# Patient Record
Sex: MALE | URBAN - METROPOLITAN AREA
[De-identification: names, ages, dates, MRNs, and addresses within clinical notes are randomized per-mention and may not be internally consistent; named-entity substitution may affect disease eponyms.]

---

## 2023-06-30 ENCOUNTER — OFFICE VISIT (OUTPATIENT)
Facility: LOCATION | Age: 68
End: 2023-06-30
Payer: COMMERCIAL

## 2023-06-30 DIAGNOSIS — H25.13 AGE-RELATED NUCLEAR CATARACT, BILATERAL: ICD-10-CM

## 2023-06-30 DIAGNOSIS — H40.1131 PRIMARY OPEN-ANGLE GLAUCOMA BILATERAL MILD STAGE: Primary | ICD-10-CM

## 2023-06-30 DIAGNOSIS — H04.123 DRY EYE SYNDROME OF BILATERAL LACRIMAL GLANDS: ICD-10-CM

## 2023-06-30 PROCEDURE — 92133 CPTRZD OPH DX IMG PST SGM ON: CPT | Performed by: OPHTHALMOLOGY

## 2023-06-30 PROCEDURE — 99214 OFFICE O/P EST MOD 30 MIN: CPT | Performed by: OPHTHALMOLOGY

## 2023-06-30 RX ORDER — LATANOPROST 50 UG/ML
0.005 % SOLUTION OPHTHALMIC
Qty: 7.5 | Refills: 3 | Status: INACTIVE
Start: 2023-06-30 | End: 2024-06-28

## 2023-06-30 RX ORDER — BIMATOPROST 0.1 MG/ML
0.01 % SOLUTION/ DROPS OPHTHALMIC
Qty: 7.5 | Refills: 3 | Status: INACTIVE
Start: 2023-06-30 | End: 2023-06-30

## 2023-06-30 ASSESSMENT — INTRAOCULAR PRESSURE
OD: 13
OS: 12

## 2023-06-30 NOTE — IMPRESSION/PLAN
Impression: 6 month f/u with OCT ONH and DFE OU. POHx: POAG mild OU, Cataract OU, CARLOS, ABMD OS. FOHx: (+) Mother wet AMD. PMHx: None reported. Eye medications: Lumigan QHS OU ($40 per bottle). TMAX: Mid teens OU. Target IOP: < 14 OU. Plan: Testing:
OCT/ONH 6/2023: OD bdl thin S, OS thin S. OU no definite progression. HVF 24-2 12/2022: OD scattered changes, OS r/o new INS. OD stable, OS possible progression (fluctuates). Pachys: 487/486. Gonio 12/2022: SS, 1+ pigment 360 OU. Today:
IOP acceptable OU. OCT ONH performed and reviewed. Informed patient of stable examination and glaucoma testing. Plan:
Continue Lumigan QHS OU. Consider change to Latanoprost if Lumigan is too expensive. RTC in 6 months with IOP check and gonio.

## 2023-06-30 NOTE — IMPRESSION/PLAN
Impression: Examination revealed nuclear cataract. Plan: Will revisit once patient visually bothered by cataracts.

## 2023-06-30 NOTE — IMPRESSION/PLAN
Impression: Examination revealed dry eye syndrome secondary to tear deficiencies. Plan: Recommend patient to use ATs regularly.

## 2024-02-02 ENCOUNTER — OFFICE VISIT (OUTPATIENT)
Facility: LOCATION | Age: 69
End: 2024-02-02
Payer: COMMERCIAL

## 2024-02-02 DIAGNOSIS — H04.123 DRY EYE SYNDROME OF BILATERAL LACRIMAL GLANDS: ICD-10-CM

## 2024-02-02 DIAGNOSIS — H25.13 AGE-RELATED NUCLEAR CATARACT, BILATERAL: ICD-10-CM

## 2024-02-02 DIAGNOSIS — H40.1131 PRIMARY OPEN-ANGLE GLAUCOMA BILATERAL MILD STAGE: Primary | ICD-10-CM

## 2024-02-02 PROCEDURE — 99213 OFFICE O/P EST LOW 20 MIN: CPT | Performed by: OPHTHALMOLOGY

## 2024-02-02 PROCEDURE — 92020 GONIOSCOPY: CPT | Performed by: OPHTHALMOLOGY

## 2024-02-02 ASSESSMENT — INTRAOCULAR PRESSURE
OS: 12
OD: 11

## 2024-08-02 ENCOUNTER — OFFICE VISIT (OUTPATIENT)
Facility: LOCATION | Age: 69
End: 2024-08-02
Payer: COMMERCIAL

## 2024-08-02 DIAGNOSIS — H40.1131 PRIMARY OPEN-ANGLE GLAUCOMA BILATERAL MILD STAGE: Primary | ICD-10-CM

## 2024-08-02 DIAGNOSIS — H25.13 AGE-RELATED NUCLEAR CATARACT, BILATERAL: ICD-10-CM

## 2024-08-02 DIAGNOSIS — H04.123 DRY EYE SYNDROME OF BILATERAL LACRIMAL GLANDS: ICD-10-CM

## 2024-08-02 PROCEDURE — 92133 CPTRZD OPH DX IMG PST SGM ON: CPT | Performed by: OPHTHALMOLOGY

## 2024-08-02 PROCEDURE — 99214 OFFICE O/P EST MOD 30 MIN: CPT | Performed by: OPHTHALMOLOGY

## 2024-08-02 ASSESSMENT — INTRAOCULAR PRESSURE
OD: 12
OS: 14

## 2025-02-06 ENCOUNTER — OFFICE VISIT (OUTPATIENT)
Facility: LOCATION | Age: 70
End: 2025-02-06
Payer: MEDICARE

## 2025-02-06 DIAGNOSIS — H40.1131 PRIMARY OPEN-ANGLE GLAUCOMA BILATERAL MILD STAGE: Primary | ICD-10-CM

## 2025-02-06 DIAGNOSIS — H25.13 AGE-RELATED NUCLEAR CATARACT, BILATERAL: ICD-10-CM

## 2025-02-06 DIAGNOSIS — H04.123 DRY EYE SYNDROME OF BILATERAL LACRIMAL GLANDS: ICD-10-CM

## 2025-02-06 PROCEDURE — 92020 GONIOSCOPY: CPT | Performed by: OPHTHALMOLOGY

## 2025-02-06 PROCEDURE — 92133 CPTRZD OPH DX IMG PST SGM ON: CPT | Performed by: OPHTHALMOLOGY

## 2025-02-06 PROCEDURE — 99214 OFFICE O/P EST MOD 30 MIN: CPT | Performed by: OPHTHALMOLOGY

## 2025-02-06 ASSESSMENT — INTRAOCULAR PRESSURE
OD: 11
OS: 10

## 2025-08-07 ENCOUNTER — OFFICE VISIT (OUTPATIENT)
Facility: LOCATION | Age: 70
End: 2025-08-07
Payer: MEDICARE

## 2025-08-07 DIAGNOSIS — H25.13 AGE-RELATED NUCLEAR CATARACT, BILATERAL: ICD-10-CM

## 2025-08-07 DIAGNOSIS — H04.123 DRY EYE SYNDROME OF BILATERAL LACRIMAL GLANDS: ICD-10-CM

## 2025-08-07 DIAGNOSIS — H40.1131 PRIMARY OPEN-ANGLE GLAUCOMA BILATERAL MILD STAGE: Primary | ICD-10-CM

## 2025-08-07 PROCEDURE — 92133 CPTRZD OPH DX IMG PST SGM ON: CPT | Performed by: OPHTHALMOLOGY

## 2025-08-07 PROCEDURE — 99213 OFFICE O/P EST LOW 20 MIN: CPT | Performed by: OPHTHALMOLOGY

## 2025-08-07 ASSESSMENT — INTRAOCULAR PRESSURE
OD: 10
OS: 10